# Patient Record
Sex: MALE | URBAN - METROPOLITAN AREA
[De-identification: names, ages, dates, MRNs, and addresses within clinical notes are randomized per-mention and may not be internally consistent; named-entity substitution may affect disease eponyms.]

---

## 2017-06-27 ENCOUNTER — APPOINTMENT (RX ONLY)
Dept: URBAN - METROPOLITAN AREA CLINIC 15 | Facility: CLINIC | Age: 32
Setting detail: DERMATOLOGY
End: 2017-06-27

## 2017-06-27 DIAGNOSIS — Z41.9 ENCOUNTER FOR PROCEDURE FOR PURPOSES OTHER THAN REMEDYING HEALTH STATE, UNSPECIFIED: ICD-10-CM

## 2017-06-27 PROCEDURE — ? COSMETIC FOLLOW-UP

## 2017-06-27 NOTE — PROCEDURE: COSMETIC FOLLOW-UP
Comments (Free Text): Patient was here for CoolSculpting follow up of 3 month. \\nCoolSculpting was performed on 03/07/17 on chest (CoolAdvantage 6.3 x 2)($1,120) and pubic area (CoolAdvantage 6.4 x 1)($560); and on 03/15/17 on lower abdomen and lower flanks (CoolAdvantage Plus 8.0 x 2 on each area)($2,240 each area). \\n\\n(S) Medication compliance, denies use of anti- inflammatory agents. \\nPatient states that he has notice a reduction in the treatment area. \\n\\n(O) Weight: 255 lb. (before 282 lb). \\nNo tenderness, hyperpigmentation, or skin lesions. Tissue is soft to palpation, and there are no granulations or nodules. Pictures were taken and compared with baseline showing good results, noticed reduction of at least 20% of the treatment area. \\n\\Rosanna discussed before we planned a 2nd treatment for lower abdomen and lower flanks. \\nFor the chest area I suggested a sonogram so see how much is fat tissue and how much is breast glandular tissue, to decide to do a 2nd treatment. \\n\\nCOOLSCULPTING TREATMENT PLAN (2nd treatment)\\nLower abdomen: CoolAdvantage Plus 8.0 x 2 ($2,240)\\nLower flanks: CoolAdvantage Plus 8.0 x 2 ($2,240)\\nChest: CoolAdvantage 6.3 x 2 ($1,120)
Detail Level: Zone
Treatment Override (Free Text): CoolSculpting

## 2017-06-27 NOTE — HPI: COSMETIC FOLLOW UP
What Condition Are We Treating?: Unwanted fat
What Procedure Did We Perform At The Last Visit?: CoolSculpting